# Patient Record
Sex: FEMALE | URBAN - METROPOLITAN AREA
[De-identification: names, ages, dates, MRNs, and addresses within clinical notes are randomized per-mention and may not be internally consistent; named-entity substitution may affect disease eponyms.]

---

## 2023-05-31 ENCOUNTER — OFFICE VISIT (OUTPATIENT)
Dept: URBAN - METROPOLITAN AREA CLINIC 82 | Facility: CLINIC | Age: 33
End: 2023-05-31

## 2023-06-07 ENCOUNTER — LAB OUTSIDE AN ENCOUNTER (OUTPATIENT)
Dept: URBAN - METROPOLITAN AREA CLINIC 82 | Facility: CLINIC | Age: 33
End: 2023-06-07

## 2023-06-07 ENCOUNTER — OFFICE VISIT (OUTPATIENT)
Dept: URBAN - METROPOLITAN AREA CLINIC 82 | Facility: CLINIC | Age: 33
End: 2023-06-07
Payer: COMMERCIAL

## 2023-06-07 ENCOUNTER — WEB ENCOUNTER (OUTPATIENT)
Dept: URBAN - METROPOLITAN AREA CLINIC 82 | Facility: CLINIC | Age: 33
End: 2023-06-07

## 2023-06-07 VITALS
HEIGHT: 60 IN | BODY MASS INDEX: 45.23 KG/M2 | WEIGHT: 230.4 LBS | SYSTOLIC BLOOD PRESSURE: 119 MMHG | HEART RATE: 86 BPM | TEMPERATURE: 97.4 F | DIASTOLIC BLOOD PRESSURE: 88 MMHG

## 2023-06-07 DIAGNOSIS — R10.30 ABDOMINAL PAIN OF UNKNOWN CAUSE: ICD-10-CM

## 2023-06-07 DIAGNOSIS — R11.0 NAUSEA: ICD-10-CM

## 2023-06-07 DIAGNOSIS — K62.5 BRIGHT RED BLOOD PER RECTUM: ICD-10-CM

## 2023-06-07 DIAGNOSIS — R19.4 BOWEL HABIT CHANGES: ICD-10-CM

## 2023-06-07 PROBLEM — 89362005: Status: ACTIVE | Noted: 2023-06-07

## 2023-06-07 PROBLEM — 74474003: Status: ACTIVE | Noted: 2023-06-07

## 2023-06-07 PROBLEM — 88111009: Status: ACTIVE | Noted: 2023-06-07

## 2023-06-07 PROBLEM — 422587007: Status: ACTIVE | Noted: 2023-06-07

## 2023-06-07 PROCEDURE — 99244 OFF/OP CNSLTJ NEW/EST MOD 40: CPT | Performed by: INTERNAL MEDICINE

## 2023-06-07 PROCEDURE — 99204 OFFICE O/P NEW MOD 45 MIN: CPT | Performed by: INTERNAL MEDICINE

## 2023-06-07 RX ORDER — POLYETHYLENE GLYCOL 3350, SODIUM SULFATE ANHYDROUS, SODIUM BICARBONATE, SODIUM CHLORIDE, POTASSIUM CHLORIDE 227.1; 21.5; 6.36; 5.53; .754 G/L; G/L; G/L; G/L; G/L
236 GM POWDER, FOR SOLUTION ORAL
Qty: 1 GALLON | Refills: 0 | OUTPATIENT
Start: 2023-06-07 | End: 2023-06-08

## 2023-06-07 RX ORDER — BISACODYL 5 MG
2 TABLET, DELAYED RELEASE (ENTERIC COATED) ORAL ONCE A DAY
Qty: 4 | OUTPATIENT
Start: 2023-06-07 | End: 2023-06-09

## 2023-06-07 NOTE — HPI-TODAY'S VISIT:
stomach problem, having discomfrt pain, epigastric ,   since teenage, has stomach problem.   menses last more than 1 month, 2 diff BCP,  after daughter 2011 cycle came back. pain came back   small hh   stomach is still bother, when on vacation blood in stool, sunday this . last 1 days  gall bladder removed , since then have 10 bm a day , eat and run   does have nausea. no vomiting.   wt loss, 245 now 230 in 2 weeks  no fhcc. no IBD  no medication tried. no constipation.

## 2023-06-08 ENCOUNTER — LAB OUTSIDE AN ENCOUNTER (OUTPATIENT)
Dept: URBAN - METROPOLITAN AREA CLINIC 82 | Facility: CLINIC | Age: 33
End: 2023-06-08

## 2023-06-08 LAB
A/G RATIO: 1.3
ABSOLUTE BASOPHILS: 62
ABSOLUTE EOSINOPHILS: 185
ABSOLUTE LYMPHOCYTES: 2173
ABSOLUTE MONOCYTES: 587
ABSOLUTE NEUTROPHILS: 7292
ALBUMIN: 4.1
ALKALINE PHOSPHATASE: 90
ALT (SGPT): 10
AST (SGOT): 15
BASOPHILS: 0.6
BILIRUBIN, TOTAL: 0.6
BUN/CREATININE RATIO: 6
BUN: 5
C-REACTIVE PROTEIN, QUANT: 19.2
CALCIUM: 9.4
CARBON DIOXIDE, TOTAL: 28
CHLORIDE: 104
CREATININE: 0.86
EGFR: 91
EOSINOPHILS: 1.8
GLOBULIN, TOTAL: 3.2
GLUCOSE: 80
HEMATOCRIT: 39.3
HEMOGLOBIN: 12.7
LYMPHOCYTES: 21.1
MCH: 24.2
MCHC: 32.3
MCV: 75
MONOCYTES: 5.7
MPV: 10.6
NEUTROPHILS: 70.8
PLATELET COUNT: 400
POTASSIUM: 4.1
PROTEIN, TOTAL: 7.3
RDW: 16.9
RED BLOOD CELL COUNT: 5.24
SODIUM: 142
WHITE BLOOD CELL COUNT: 10.3

## 2023-06-09 ENCOUNTER — OFFICE VISIT (OUTPATIENT)
Dept: URBAN - METROPOLITAN AREA SURGERY CENTER 13 | Facility: SURGERY CENTER | Age: 33
End: 2023-06-09
Payer: COMMERCIAL

## 2023-06-09 DIAGNOSIS — K63.89 OTHER SPECIFIED DISEASES OF INTESTINE: ICD-10-CM

## 2023-06-09 DIAGNOSIS — R10.84 ABDOMINAL PAIN, GENERALIZED: ICD-10-CM

## 2023-06-09 DIAGNOSIS — K29.60 OTHER GASTRITIS WITHOUT BLEEDING: ICD-10-CM

## 2023-06-09 PROCEDURE — 43239 EGD BIOPSY SINGLE/MULTIPLE: CPT | Performed by: INTERNAL MEDICINE

## 2023-06-09 PROCEDURE — 45380 COLONOSCOPY AND BIOPSY: CPT | Performed by: INTERNAL MEDICINE

## 2023-06-09 PROCEDURE — G8907 PT DOC NO EVENTS ON DISCHARG: HCPCS | Performed by: INTERNAL MEDICINE

## 2023-06-09 RX ORDER — BISACODYL 5 MG
2 TABLET, DELAYED RELEASE (ENTERIC COATED) ORAL ONCE A DAY
Qty: 4 | Status: ACTIVE | COMMUNITY
Start: 2023-06-07 | End: 2023-06-09

## 2023-06-14 LAB
ADENOVIRUS F 40/41: NOT DETECTED
CAMPYLOBACTER: NOT DETECTED
CLOSTRIDIUM DIFFICILE: NOT DETECTED
CRYPTOSPORIDIUM: NOT DETECTED
ENTAMOEBA HISTOLYTICA: NOT DETECTED
ENTEROAGGREGATIVE E.COLI: NOT DETECTED
ENTEROTOXIGENIC E.COLI: NOT DETECTED
ESCHERICHIA COLI O157: NOT DETECTED
GIARDIA LAMBLIA: NOT DETECTED
NOROVIRUS GI/GII: NOT DETECTED
ROTAVIRUS A: NOT DETECTED
SALMONELLA SPP.: NOT DETECTED
SHIGA-LIKE TOXIN PRODUCING E.COLI: NOT DETECTED
SHIGELLA SPP. / ENTEROINVASIVE E.COLI: NOT DETECTED
VIBRIO PARAHAEMOLYTICUS: NOT DETECTED
VIBRIO PARAHAEMOLYTICUS: NOT DETECTED
VIBRIO SPP.: NOT DETECTED
YERSINIA ENTEROCOLITICA: NOT DETECTED

## 2023-06-15 LAB — CALPROTECTIN, FECAL: 46

## 2023-08-09 ENCOUNTER — DASHBOARD ENCOUNTERS (OUTPATIENT)
Age: 33
End: 2023-08-09

## 2023-08-09 ENCOUNTER — OFFICE VISIT (OUTPATIENT)
Dept: URBAN - METROPOLITAN AREA CLINIC 82 | Facility: CLINIC | Age: 33
End: 2023-08-09
Payer: COMMERCIAL

## 2023-08-09 VITALS
HEIGHT: 60 IN | WEIGHT: 233 LBS | DIASTOLIC BLOOD PRESSURE: 91 MMHG | BODY MASS INDEX: 45.75 KG/M2 | HEART RATE: 82 BPM | SYSTOLIC BLOOD PRESSURE: 130 MMHG | TEMPERATURE: 96.9 F

## 2023-08-09 DIAGNOSIS — R11.0 NAUSEA: ICD-10-CM

## 2023-08-09 DIAGNOSIS — R19.4 BOWEL HABIT CHANGES: ICD-10-CM

## 2023-08-09 DIAGNOSIS — K62.5 BRIGHT RED BLOOD PER RECTUM: ICD-10-CM

## 2023-08-09 DIAGNOSIS — R63.4 WEIGHT LOSS: ICD-10-CM

## 2023-08-09 DIAGNOSIS — R10.30 LOWER ABDOMINAL PAIN, UNSPECIFIED: ICD-10-CM

## 2023-08-09 PROCEDURE — 99213 OFFICE O/P EST LOW 20 MIN: CPT | Performed by: INTERNAL MEDICINE

## 2023-08-09 NOTE — HPI-TODAY'S VISIT:
STOMACH IS ONE DAY PAINFUL, SHOOTING TOP PART.   EAT GOOD, NO N/V, BM REGULAR, NO BLEEDING P/R   NO WT LOSS.